# Patient Record
Sex: FEMALE | Race: WHITE | NOT HISPANIC OR LATINO | URBAN - METROPOLITAN AREA
[De-identification: names, ages, dates, MRNs, and addresses within clinical notes are randomized per-mention and may not be internally consistent; named-entity substitution may affect disease eponyms.]

---

## 2020-07-29 ENCOUNTER — EMERGENCY (EMERGENCY)
Facility: HOSPITAL | Age: 55
LOS: 0 days | Discharge: HOME | End: 2020-07-30
Attending: EMERGENCY MEDICINE | Admitting: EMERGENCY MEDICINE
Payer: COMMERCIAL

## 2020-07-29 VITALS
HEART RATE: 90 BPM | SYSTOLIC BLOOD PRESSURE: 141 MMHG | WEIGHT: 255.07 LBS | DIASTOLIC BLOOD PRESSURE: 85 MMHG | OXYGEN SATURATION: 98 % | TEMPERATURE: 99 F | RESPIRATION RATE: 22 BRPM

## 2020-07-29 DIAGNOSIS — R05 COUGH: ICD-10-CM

## 2020-07-29 DIAGNOSIS — Z88.0 ALLERGY STATUS TO PENICILLIN: ICD-10-CM

## 2020-07-29 DIAGNOSIS — R06.02 SHORTNESS OF BREATH: ICD-10-CM

## 2020-07-29 DIAGNOSIS — R07.89 OTHER CHEST PAIN: ICD-10-CM

## 2020-07-29 DIAGNOSIS — R06.2 WHEEZING: ICD-10-CM

## 2020-07-29 LAB
ANION GAP SERPL CALC-SCNC: 12 MMOL/L — SIGNIFICANT CHANGE UP (ref 7–14)
BASOPHILS # BLD AUTO: 0.04 K/UL — SIGNIFICANT CHANGE UP (ref 0–0.2)
BASOPHILS NFR BLD AUTO: 0.5 % — SIGNIFICANT CHANGE UP (ref 0–1)
BUN SERPL-MCNC: 11 MG/DL — SIGNIFICANT CHANGE UP (ref 10–20)
CALCIUM SERPL-MCNC: 8.9 MG/DL — SIGNIFICANT CHANGE UP (ref 8.5–10.1)
CHLORIDE SERPL-SCNC: 102 MMOL/L — SIGNIFICANT CHANGE UP (ref 98–110)
CO2 SERPL-SCNC: 23 MMOL/L — SIGNIFICANT CHANGE UP (ref 17–32)
CREAT SERPL-MCNC: 0.7 MG/DL — SIGNIFICANT CHANGE UP (ref 0.7–1.5)
D DIMER BLD IA.RAPID-MCNC: 169 NG/ML DDU — SIGNIFICANT CHANGE UP (ref 0–230)
EOSINOPHIL # BLD AUTO: 0.13 K/UL — SIGNIFICANT CHANGE UP (ref 0–0.7)
EOSINOPHIL NFR BLD AUTO: 1.7 % — SIGNIFICANT CHANGE UP (ref 0–8)
GLUCOSE SERPL-MCNC: 89 MG/DL — SIGNIFICANT CHANGE UP (ref 70–99)
HCT VFR BLD CALC: 39.5 % — SIGNIFICANT CHANGE UP (ref 37–47)
HGB BLD-MCNC: 12.6 G/DL — SIGNIFICANT CHANGE UP (ref 12–16)
IMM GRANULOCYTES NFR BLD AUTO: 0.3 % — SIGNIFICANT CHANGE UP (ref 0.1–0.3)
LYMPHOCYTES # BLD AUTO: 2.67 K/UL — SIGNIFICANT CHANGE UP (ref 1.2–3.4)
LYMPHOCYTES # BLD AUTO: 34.4 % — SIGNIFICANT CHANGE UP (ref 20.5–51.1)
MCHC RBC-ENTMCNC: 28.4 PG — SIGNIFICANT CHANGE UP (ref 27–31)
MCHC RBC-ENTMCNC: 31.9 G/DL — LOW (ref 32–37)
MCV RBC AUTO: 89 FL — SIGNIFICANT CHANGE UP (ref 81–99)
MONOCYTES # BLD AUTO: 0.66 K/UL — HIGH (ref 0.1–0.6)
MONOCYTES NFR BLD AUTO: 8.5 % — SIGNIFICANT CHANGE UP (ref 1.7–9.3)
NEUTROPHILS # BLD AUTO: 4.24 K/UL — SIGNIFICANT CHANGE UP (ref 1.4–6.5)
NEUTROPHILS NFR BLD AUTO: 54.6 % — SIGNIFICANT CHANGE UP (ref 42.2–75.2)
NRBC # BLD: 0 /100 WBCS — SIGNIFICANT CHANGE UP (ref 0–0)
NT-PROBNP SERPL-SCNC: 27 PG/ML — SIGNIFICANT CHANGE UP (ref 0–300)
PLATELET # BLD AUTO: 251 K/UL — SIGNIFICANT CHANGE UP (ref 130–400)
POTASSIUM SERPL-MCNC: 4.4 MMOL/L — SIGNIFICANT CHANGE UP (ref 3.5–5)
POTASSIUM SERPL-SCNC: 4.4 MMOL/L — SIGNIFICANT CHANGE UP (ref 3.5–5)
RBC # BLD: 4.44 M/UL — SIGNIFICANT CHANGE UP (ref 4.2–5.4)
RBC # FLD: 15.2 % — HIGH (ref 11.5–14.5)
SODIUM SERPL-SCNC: 137 MMOL/L — SIGNIFICANT CHANGE UP (ref 135–146)
TROPONIN T SERPL-MCNC: <0.01 NG/ML — SIGNIFICANT CHANGE UP
WBC # BLD: 7.76 K/UL — SIGNIFICANT CHANGE UP (ref 4.8–10.8)
WBC # FLD AUTO: 7.76 K/UL — SIGNIFICANT CHANGE UP (ref 4.8–10.8)

## 2020-07-29 PROCEDURE — 71045 X-RAY EXAM CHEST 1 VIEW: CPT | Mod: 26

## 2020-07-29 PROCEDURE — 93010 ELECTROCARDIOGRAM REPORT: CPT

## 2020-07-29 PROCEDURE — 99285 EMERGENCY DEPT VISIT HI MDM: CPT

## 2020-07-29 RX ADMIN — Medication 125 MILLIGRAM(S): at 22:39

## 2020-07-30 VITALS
HEART RATE: 73 BPM | DIASTOLIC BLOOD PRESSURE: 64 MMHG | RESPIRATION RATE: 18 BRPM | SYSTOLIC BLOOD PRESSURE: 130 MMHG | TEMPERATURE: 98 F | OXYGEN SATURATION: 99 %

## 2020-07-30 LAB
HCG SERPL QL: NEGATIVE — SIGNIFICANT CHANGE UP
TROPONIN T SERPL-MCNC: <0.01 NG/ML — SIGNIFICANT CHANGE UP

## 2020-07-30 PROCEDURE — 93010 ELECTROCARDIOGRAM REPORT: CPT

## 2020-07-30 PROCEDURE — 99236 HOSP IP/OBS SAME DATE HI 85: CPT

## 2020-07-30 PROCEDURE — 75574 CT ANGIO HRT W/3D IMAGE: CPT | Mod: 26

## 2020-07-30 RX ORDER — DILTIAZEM HCL 120 MG
30 CAPSULE, EXT RELEASE 24 HR ORAL ONCE
Refills: 0 | Status: DISCONTINUED | OUTPATIENT
Start: 2020-07-30 | End: 2020-07-30

## 2020-07-30 RX ORDER — DILTIAZEM HCL 120 MG
30 CAPSULE, EXT RELEASE 24 HR ORAL ONCE
Refills: 0 | Status: COMPLETED | OUTPATIENT
Start: 2020-07-30 | End: 2020-07-30

## 2020-07-30 RX ADMIN — Medication 30 MILLIGRAM(S): at 09:59

## 2020-07-30 NOTE — ED PROVIDER NOTE - PROGRESS NOTE DETAILS
Pt aware that we will have official radiology read pending, and may result in change of xray read.  Pt voices understanding of use of medications, instructions for care, and reasons to return or to go to ED  OBS for CP protocol, pt amenable

## 2020-07-30 NOTE — ED CDU PROVIDER INITIAL DAY NOTE - MEDICAL DECISION MAKING DETAILS
53yo woman no significant PMH was placed in CDU for workup of chest pain. Pt reports cough, wheezing over the last several days, no fever or chills. She does report that she has been participating in renovation of her house, has been around a lot of dust, cleaning products, etc. Had a telehealth visit and was given zpack and albuterol without much improvement so was sent to the ED. Workup was unremarkable with labs, EKG and CXR; she was given steroids for persistent wheezing. On my eval pt still with slight expiratory wheeze and dry cough, but no SOB and says pain is better. Given wheeze, no beta blockade, will d/w radiology re possibility of cardizem, CCTA.

## 2020-07-30 NOTE — ED CDU PROVIDER INITIAL DAY NOTE - OBJECTIVE STATEMENT
53y/o female with no significant pmh, pt. c/o left sided cp associated with left arm pain, sob, cough and wheezing x 3 days. no hx of asthma/copd. pt. was evaluated by tele doc and was given an inhaler with a zpack which she is currently taking with minimal improvement in sx. pt. has been renovating  her house over the last several weeks. no fever, chills, dizziness, abdominal pain. cp is not exertional. pt. states her sob and wheezing improved after she received steroids in er today. never smoker. last stress test was 5 years ago.

## 2020-07-30 NOTE — ED PROVIDER NOTE - PHYSICAL EXAMINATION
CONSTITUTIONAL: Well-appearing; well-nourished; in no apparent distress.   CARDIOVASCULAR: Normal S1, S2; no murmurs, rubs, or gallops.   RESPIRATORY: Normal chest excursion with respiration; breath sounds clear and equal bilaterally; no wheezes, rhonchi, or rales.  GI/: non-distended; non-tender; no palpable organomegaly.   SKIN: Normal for age and race; warm; dry; good turgor; no apparent lesions or exudate.   NEURO/PSYCH: A & O x 4; grossly unremarkable. mood and manner are appropriate. Grooming and personal hygiene are appropriate. No apparent thoughts of harm to self or others.

## 2020-07-30 NOTE — ED CDU PROVIDER DISPOSITION NOTE - PATIENT PORTAL LINK FT
You can access the FollowMyHealth Patient Portal offered by [facility] by registering at the following website: [email or web address]. By joining [employer]’s FollowMyHealth portal, you will also be able to view your health information using other applications (apps) compatible with our system.

## 2020-07-30 NOTE — ED CDU PROVIDER DISPOSITION NOTE - CLINICAL COURSE
55yo woman no significant PMH was placed in CDU for workup of chest pain. Pt reports cough, wheezing over the last several days, no fever or chills. She does report that she has been participating in renovation of her house, has been around a lot of dust, cleaning products, etc. Had a telehealth visit and was given zpack and albuterol without much improvement so was sent to the ED. Workup was unremarkable with labs, EKG and CXR; she was given steroids for persistent wheezing. On my eval pt still with slight expiratory wheeze and dry cough, but no SOB and says pain is better. Given wheezing, pt was given PO cardizem for rate control. CCTA was normal with CAD-RAD 0. Sx likely pulmonary in origin. Will d/c with f/u PMD and give short course prednisone for sx.

## 2020-07-30 NOTE — ED CDU PROVIDER INITIAL DAY NOTE - ATTENDING CONTRIBUTION TO CARE
55yo woman no significant PMH was placed in CDU for workup of chest pain. Pt reports cough, wheezing over the last several days, no fever or chills. She does report that she has been participating in renovation of her house, has been around a lot of dust, cleaning products, etc. Had a telehealth visit and was given zpack and albuterol without much improvement so was sent to the ED. Workup was unremarkable with labs, EKG and CXR; she was given steroids for persistent wheezing. On my eval pt still with slight expiratory wheeze and dry cough, but no SOB and says pain is better. Given wheeze, no beta blockade, will d/w radiology re possibility of cardizem, CCTA.

## 2020-07-30 NOTE — ED CDU PROVIDER DISPOSITION NOTE - ATTENDING CONTRIBUTION TO CARE
53yo woman no significant PMH was placed in CDU for workup of chest pain. Pt reports cough, wheezing over the last several days, no fever or chills. She does report that she has been participating in renovation of her house, has been around a lot of dust, cleaning products, etc. Had a telehealth visit and was given zpack and albuterol without much improvement so was sent to the ED. Workup was unremarkable with labs, EKG and CXR; she was given steroids for persistent wheezing. On my eval pt still with slight expiratory wheeze and dry cough, but no SOB and says pain is better. Given wheezing, pt was given PO cardizem for rate control. CCTA was normal with CAD-RAD 0. Sx likely pulmonary in origin. Will d/c with f/u PMD and give short course prednisone for sx.

## 2020-07-30 NOTE — ED CDU PROVIDER INITIAL DAY NOTE - PROGRESS NOTE DETAILS
pt seen bedside, pt has expiratory wheeze. states she has been using a lot of cleaning supplies and renovating at home. Negative cardiac enzymes x2 and nl ekg. pt scheduled to go for CCTA. Will continue to monitor patient. trying to maximize patients blood pressure for CCTA with CCB. will go for test soon. Will continue to monitor patient

## 2020-07-30 NOTE — ED PROVIDER NOTE - NS ED ROS FT
Constitutional: no fever, chills, no recent weight loss, change in appetite or malaise  Eyes: no redness/discharge/pain/vision changes  ENT: no rhinorrhea/ear pain/sore throat  Cardiac: No edema.  Respiratory: No cough or respiratory distress  GI: No nausea, vomiting, diarrhea or abdominal pain.  : No dysuria, frequency, urgency or hematuria  MS: no pain to back or extremities, no loss of ROM, no weakness  Neuro: No headache or weakness. No LOC.  Skin: No skin rash.  Endocrine: No history of thyroid disease or diabetes.  Except as documented in the HPI, all other systems are negative.

## 2020-07-30 NOTE — ED PROVIDER NOTE - OBJECTIVE STATEMENT
pt with no pmhx presents for cp and sob for 1 day. pain is sharp, nonradiating, moderate. denies exacerbating or relieving factors. pt sts started experiencing wheezing and sob a few days ago and called telemed, was given albuterol pump and zpak. pt today notes cp and sob, went to  and was sent to ED for further eval 2/2 to also reporting HR elev 100-115 at home via apple watch. no ocp use, no recent travel, no sick contacts, neg social hx; pain is sharp, nonradiating, moderate. denies exacerbating or relieving factors. Denies fever/chill/HA/dizziness/palpitation/abd pain/n/v/d/ black stool/bloody stool/urinary sxs

## 2020-07-30 NOTE — ED CDU PROVIDER DISPOSITION NOTE - CARE PROVIDER_API CALL
Mihir Obrien (MD)  Cardiovascular Disease; Internal Medicine; Interventional Cardiology  [address] [place] [address]  Phone: [phone]  Fax: [phone]  Follow Up Time:

## 2020-07-30 NOTE — ED ADULT NURSE REASSESSMENT NOTE - NS ED NURSE REASSESS COMMENT FT1
PT aox4 in no acute distress denies chest pain / discomfort/ headache / dizziness. respirations even / unlabored. rpt trops sent over night. pt remains on cardiac monitor + continuous pulse ox. no acute episodes overnight.  will continue to monitor / assess
Not in any acute distress . Will continue to moniter .

## 2020-07-30 NOTE — ED ADULT NURSE NOTE - OBJECTIVE STATEMENT
pt aox4 complaining of shortness of breath x2 days. respirations even / unlabored. pt able to speak full sentences. iv in place, labs sent. vssp. pt being placed under obs. will continue to monitor / assess

## 2020-07-30 NOTE — ED PROVIDER NOTE - ATTENDING CONTRIBUTION TO CARE
55 yo female presented c/o sharp CP associated with SOB x 1 day. Pt reported she was having some ? wheezing couple days earlier and was given albuterol and Zpak via telemed evaluation. Pt reported some exertional dyspnea. Denied any immobility, hormone use, previous workup. No syncope, N/V/D, abdominal pain, fevers, cough.    VITAL SIGNS: noted  CONSTITUTIONAL: Well-developed; well-nourished; in no acute distress  HEAD: Normocephalic; atraumatic  EYES: PERRL, EOM intact; conjunctiva and sclera clear  ENT: No nasal discharge; airway clear. MMM  NECK: Supple; non tender   CARD: S1, S2 normal; no murmurs, gallops, or rubs. Regular rate and rhythm  RESP: CTAB/L, no wheezes, rales or rhonchi  ABD: Normal bowel sounds; soft; non-distended; non-tender; no CVA tenderness  EXT: Normal ROM. No calf tenderness or edema. Distal pulses intact  NEURO: Alert, oriented. Grossly unremarkable. No focal deficits  SKIN: Skin exam is warm and dry

## 2020-07-30 NOTE — ED CDU PROVIDER DISPOSITION NOTE - NSFOLLOWUPINSTRUCTIONS_ED_ALL_ED_FT
follow up DR. Obrien/ PMD/ PULM    start prednisone     Chest Pain    Chest pain can be caused by many different conditions which may or may not be dangerous. Causes include heartburn, lung infections, heart attack, blood clot in lungs, skin infections, strain or damage to muscle, cartilage, or bones, etc. In addition to a history and physical examination, an electrocardiogram (ECG) or other lab tests may have been performed to determine the cause of your chest pain. Follow up with your primary care provider or with a cardiologist as instructed.     SEEK IMMEDIATE MEDICAL CARE IF YOU HAVE ANY OF THE FOLLOWING SYMPTOMS: worsening chest pain, coughing up blood, unexplained back/neck/jaw pain, severe abdominal pain, dizziness or lightheadedness, fainting, shortness of breath, sweaty or clammy skin, vomiting, or racing heart beat. These symptoms may represent a serious problem that is an emergency. Do not wait to see if the symptoms will go away. Get medical help right away. Call 911 and do not drive yourself to the hospital.

## 2020-07-30 NOTE — ED PROVIDER NOTE - CLINICAL SUMMARY MEDICAL DECISION MAKING FREE TEXT BOX
pt seen for cp and SOB, labs unremarkable including dimer, BNP and troponin, CXR NAPD. Pt placed in EDOU for further workup CP.

## 2021-02-26 PROBLEM — Z78.9 OTHER SPECIFIED HEALTH STATUS: Chronic | Status: ACTIVE | Noted: 2020-07-30

## 2021-03-25 PROBLEM — Z00.00 ENCOUNTER FOR PREVENTIVE HEALTH EXAMINATION: Status: ACTIVE | Noted: 2021-03-25

## 2021-03-31 ENCOUNTER — APPOINTMENT (OUTPATIENT)
Dept: CARDIOLOGY | Facility: CLINIC | Age: 56
End: 2021-03-31
Payer: COMMERCIAL

## 2021-03-31 ENCOUNTER — TRANSCRIPTION ENCOUNTER (OUTPATIENT)
Age: 56
End: 2021-03-31

## 2021-03-31 VITALS
DIASTOLIC BLOOD PRESSURE: 84 MMHG | TEMPERATURE: 97.2 F | HEART RATE: 62 BPM | HEIGHT: 68 IN | SYSTOLIC BLOOD PRESSURE: 136 MMHG | WEIGHT: 236 LBS | BODY MASS INDEX: 35.77 KG/M2

## 2021-03-31 DIAGNOSIS — Z13.6 ENCOUNTER FOR SCREENING FOR CARDIOVASCULAR DISORDERS: ICD-10-CM

## 2021-03-31 DIAGNOSIS — I10 ESSENTIAL (PRIMARY) HYPERTENSION: ICD-10-CM

## 2021-03-31 PROCEDURE — 93000 ELECTROCARDIOGRAM COMPLETE: CPT

## 2021-03-31 PROCEDURE — 99204 OFFICE O/P NEW MOD 45 MIN: CPT

## 2021-03-31 PROCEDURE — 99072 ADDL SUPL MATRL&STAF TM PHE: CPT

## 2021-03-31 NOTE — PHYSICAL EXAM
[General Appearance - Well Developed] : well developed [General Appearance - In No Acute Distress] : no acute distress [Normal Conjunctiva] : the conjunctiva exhibited no abnormalities [Eyelids - No Xanthelasma] : the eyelids demonstrated no xanthelasmas [FreeTextEntry1] : no JVD [Heart Rate And Rhythm] : heart rate and rhythm were normal [Heart Sounds] : normal S1 and S2 [Murmurs] : no murmurs present [Respiration, Rhythm And Depth] : normal respiratory rhythm and effort [Exaggerated Use Of Accessory Muscles For Inspiration] : no accessory muscle use [Auscultation Breath Sounds / Voice Sounds] : lungs were clear to auscultation bilaterally [Abdomen Soft] : soft [Abdomen Tenderness] : non-tender [Abdomen Mass (___ Cm)] : no abdominal mass palpated [Abnormal Walk] : normal gait [Gait - Sufficient For Exercise Testing] : the gait was sufficient for exercise testing [Nail Clubbing] : no clubbing of the fingernails [Cyanosis, Localized] : no localized cyanosis [Skin Color & Pigmentation] : normal skin color and pigmentation [] : no rash [No Skin Ulcers] : no skin ulcer [Oriented To Time, Place, And Person] : oriented to person, place, and time

## 2021-03-31 NOTE — ASSESSMENT
[FreeTextEntry1] : Severe Hypertension on Withings cuff likely device malfunction\par No CAD\par \par Imaging and BP log reviewed\par \par EKG (3/31/2021):  NSR, no ST-T changes\par CCTA (7/30/2020):  Normal coronaries, CACS 0\par \par Buy a new BP machine and monitor for now\par Risk factor modification\par Diet / Weight loss discussed\par Follow up PRN

## 2021-03-31 NOTE — HISTORY OF PRESENT ILLNESS
[FreeTextEntry1] : 54 yo F c/o frequent elevated BP peak 180/100s on Withings cuff likely device malfunction.  Went to ER last year for similar complaints.  Runs 3 miles/day.  No complaints.\par \par \par EKG (3/31/2021):  NSR, no ST-T changes\par CCTA (7/30/2020):  Normal coronaries, CACS 0\par

## 2022-07-17 ENCOUNTER — RX ONLY (RX ONLY)
Age: 57
End: 2022-07-17

## 2022-07-17 ENCOUNTER — OFFICE (OUTPATIENT)
Dept: URBAN - METROPOLITAN AREA CLINIC 92 | Facility: CLINIC | Age: 57
Setting detail: OPHTHALMOLOGY
End: 2022-07-17
Payer: COMMERCIAL

## 2022-07-17 DIAGNOSIS — H02.841: ICD-10-CM

## 2022-07-17 DIAGNOSIS — L25.5: ICD-10-CM

## 2022-07-17 PROCEDURE — 99203 OFFICE O/P NEW LOW 30 MIN: CPT | Performed by: OPHTHALMOLOGY

## 2022-07-17 ASSESSMENT — REFRACTION_AUTOREFRACTION
OS_AXIS: 158
OS_CYLINDER: +1.50
OD_CYLINDER: +2.00
OS_SPHERE: -4.00
OD_SPHERE: -3.75
OD_AXIS: 10

## 2022-07-17 ASSESSMENT — REFRACTION_CURRENTRX
OD_OVR_VA: 20/
OS_SPHERE: -2.25
OS_OVR_VA: 20/
OD_AXIS: 98
OD_CYLINDER: -1.75
OS_CYLINDER: -1.75
OD_SPHERE: -2.25
OS_AXIS: 74

## 2022-07-17 ASSESSMENT — CONFRONTATIONAL VISUAL FIELD TEST (CVF)
OD_FINDINGS: FULL
OS_FINDINGS: FULL

## 2022-07-17 ASSESSMENT — TONOMETRY
OD_IOP_MMHG: 14
OS_IOP_MMHG: 14

## 2022-07-17 ASSESSMENT — KERATOMETRY
OD_AXISANGLE_DEGREES: 26
OD_K1POWER_DIOPTERS: 44.25
OD_K2POWER_DIOPTERS: 45.25

## 2022-07-17 ASSESSMENT — LID EXAM ASSESSMENTS: OD_EDEMA: RUL 1+ 2+

## 2022-07-17 ASSESSMENT — SPHEQUIV_DERIVED
OS_SPHEQUIV: -3.25
OD_SPHEQUIV: -2.75

## 2022-07-17 ASSESSMENT — VISUAL ACUITY
OS_BCVA: 20/20
OD_BCVA: 20/20

## 2022-07-17 ASSESSMENT — AXIALLENGTH_DERIVED: OD_AL: 24.2205

## 2024-11-18 NOTE — ED ADULT NURSE NOTE - NS_SISCREENINGSR_GEN_ALL_ED
Assessment    I spoke to the patient this morning by phone for her monthly PCM follow ups. Patient reports no falls or injuries since our last conversation. Patient's appointments reviewed and patient is aware. Patient will be going out of town for the Thanksgiving holiday and will be having ordered labs taken before she leaves. The patient reports that her blood pressures are stable and denies any novel or exacerbated cardiac symptoms at this time. She reports that she took her last Ozempic dose one day late and it resulted in a high blood sugar reading. This has now come down into the 130 range. The patient voices understanding of nutrition and hydration recommendations. The patient voices interest in the possibility of a continuous blood glucose monitor. The patient voices understanding of how to get a hold of me if needed. The patient voices understanding of how to get a hold of me if needed.     Education and Self-Management of Care    Article on HTN sent by mail  Patient to contact [facility] for specifics on insurance changes in the coming year  Patient will attend specialist and primary care appointments as indicated  Patient will have labs taken towards the end of this week.     Plan of Care and Goals    See Care Plan Note  Healthy nutrition, hydration, and activity  Patient will remain free from falls/injuries    Barriers:    Management of acute and chronic health concerns    Progress:    Progressing    Next outreach: One Month                             Negative